# Patient Record
Sex: MALE | Race: ASIAN | NOT HISPANIC OR LATINO | URBAN - METROPOLITAN AREA
[De-identification: names, ages, dates, MRNs, and addresses within clinical notes are randomized per-mention and may not be internally consistent; named-entity substitution may affect disease eponyms.]

---

## 2022-08-15 ENCOUNTER — EMERGENCY (EMERGENCY)
Facility: HOSPITAL | Age: 62
LOS: 1 days | Discharge: HOME | End: 2022-08-15
Attending: EMERGENCY MEDICINE | Admitting: EMERGENCY MEDICINE
Payer: MEDICAID

## 2022-08-15 VITALS
OXYGEN SATURATION: 98 % | WEIGHT: 175.05 LBS | RESPIRATION RATE: 16 BRPM | DIASTOLIC BLOOD PRESSURE: 84 MMHG | HEART RATE: 88 BPM | SYSTOLIC BLOOD PRESSURE: 139 MMHG | TEMPERATURE: 97 F

## 2022-08-15 VITALS
DIASTOLIC BLOOD PRESSURE: 80 MMHG | RESPIRATION RATE: 18 BRPM | SYSTOLIC BLOOD PRESSURE: 130 MMHG | TEMPERATURE: 98 F | HEART RATE: 80 BPM | OXYGEN SATURATION: 98 %

## 2022-08-15 PROCEDURE — 99284 EMERGENCY DEPT VISIT MOD MDM: CPT | Mod: 25

## 2022-08-15 PROCEDURE — 70490 CT SOFT TISSUE NECK W/O DYE: CPT | Mod: MA

## 2022-08-15 PROCEDURE — 99284 EMERGENCY DEPT VISIT MOD MDM: CPT

## 2022-08-15 PROCEDURE — 70490 CT SOFT TISSUE NECK W/O DYE: CPT | Mod: 26,MA

## 2022-08-15 NOTE — CONSULT NOTE ADULT - ASSESSMENT
dysphagia  forgein body sensation    no signs of complete obstruction or perforation  check CT neck r/o foreign body  if negative he can be discharged with outpatient follow up with ENT as complaints are mostly in his neck

## 2022-08-15 NOTE — ED PROVIDER NOTE - PATIENT PORTAL LINK FT
You can access the FollowMyHealth Patient Portal offered by [facility] by registering at the following website: [email or web address]. By joining [employer]’s FollowMyHealth portal, you will also be able to view your health information using other applications (apps) compatible with our system.

## 2022-08-15 NOTE — ED PROVIDER NOTE - CLINICAL SUMMARY MEDICAL DECISION MAKING FREE TEXT BOX
Montemayor: 60 yo M PMHx HTN, DM, HLD presents to ED c/o intermittent FB sensation in throat x 2 weeks. Pt reports choking episodes with water off and on over the last 2 weeks. Saw his doctor after first episode, was prescribed Ibuprofen and Maalox. Pt reports no improvement. Denies any dysphagia with solid foods. Pt denies chest pain, SOB, fever/chills, abd pain, N/V.

## 2022-08-15 NOTE — CONSULT NOTE ADULT - SUBJECTIVE AND OBJECTIVE BOX
[facility]  Marty Cedeno PA-C  [address] [place] [id number] [phone]      Chief Complaint:  Patient is a 61y old  Male who presents with a chief complaint of     HPI:62 yo M PMHx HTN, DM, HLD presents to ED c/o intermittent FB sensation in throat x 2 weeks. Pt reports choking episodes with water off and on over the last 2 weeks. Saw his doctor after first episode, was prescribed Ibuprofen and Maalox. Pt reports no improvement. Denies any dysphagia with solid foods. Pt denies chest pain, SOB, fever/chills, abd pain, N/V.    Allergies:  No Known Allergies      Medications:      PMHX/PSHX:  Diabetes    Hypercholesteremia    Hypertension    No significant past surgical history        Family history:      Social History:     ROS:     General:  No wt loss, fevers, chills, night sweats, fatigue,   Eyes:  Good vision, no reported pain  ENT:  No sore throat, pain, runny nose, dysphagia  CV:  No pain, palpitations, hypo/hypertension  Resp:  No dyspnea, cough, tachypnea, wheezing  GI:  No pain, No nausea, No vomiting, No diarrhea, No constipation, No weight loss, No fever, No pruritis, No rectal bleeding, No tarry stools, No dysphagia,  :  No pain, bleeding, incontinence, nocturia  Muscle:  No pain, weakness  Neuro:  No weakness, tingling, memory problems  Psych:  No fatigue, insomnia, mood problems, depression  Endocrine:  No polyuria, polydipsia, cold/heat intolerance  Heme:  No petechiae, ecchymosis, easy bruisability  Skin:  No rash, tattoos, scars, edema      PHYSICAL EXAM:   Vital Signs:  Vital Signs Last 24 Hrs  T(C): 36.2 (15 Aug 2022 10:37), Max: 36.2 (15 Aug 2022 10:37)  T(F): 97.2 (15 Aug 2022 10:37), Max: 97.2 (15 Aug 2022 10:37)  HR: 88 (15 Aug 2022 10:37) (88 - 88)  BP: 139/84 (15 Aug 2022 10:37) (139/84 - 139/84)  BP(mean): --  RR: 16 (15 Aug 2022 10:37) (16 - 16)  SpO2: 98% (15 Aug 2022 10:37) (98% - 98%)    Parameters below as of 15 Aug 2022 10:37  Patient On (Oxygen Delivery Method): room air      Daily     Daily     GENERAL:  Appears stated age,   HEENT:  NC/AT,    CHEST:  Full & symmetric excursion,   HEART:  Regular rhythm  ABDOMEN:  Soft, non-tender, non-distended,   EXTEREMITIES:  no cyanosis,clubbing or edema  SKIN:  No rash  NEURO:  Alert,    LABS:                    Imaging:

## 2022-08-15 NOTE — ED PROVIDER NOTE - NSFOLLOWUPINSTRUCTIONS_ED_ALL_ED_FT
1) Follow up with ENT within 1 week  2) Return to the ED immediately for new or worsening symptoms as we discussed.    Dysphagia       Dysphagia is trouble swallowing. This condition occurs when solids and liquids stick in a person's throat on the way down to the stomach, or when food takes longer to get to the stomach than usual.    You may have problems swallowing food, liquids, or both. You may also have pain while trying to swallow. It may take you more time and effort to swallow something.      What are the causes?    This condition may be caused by:  •Muscle problems. These may make it difficult for you to move food and liquids through the esophagus, which is the tube that connects your mouth to your stomach.    •Blockages. You may have ulcers, scar tissue, or inflammation that blocks the normal passage of food and liquids. Causes of these problems include:  •Acid reflux from your stomach into your esophagus (gastroesophageal reflux).      •Infections.      •Radiation treatment for cancer.      •Medicines taken without enough fluids to wash them down into your stomach.        •Stroke. This can affect the nerves and make it difficult to swallow.      •Nerve problems. These prevent signals from being sent to the muscles of your esophagus to squeeze (contract) and move what you swallow down to your stomach.      •Globus pharyngeus. This is a common problem that involves a feeling like something is stuck in your throat or a sense of trouble with swallowing, even though nothing is wrong with the swallowing passages.      •Certain conditions, such as cerebral palsy or Parkinson's disease.        What are the signs or symptoms?    Common symptoms of this condition include:  •A feeling that solids or liquids are stuck in your throat on the way down to the stomach.      •Pain while swallowing.      •Coughing or gagging while trying to swallow.      Other symptoms include:  •Food moving back from your stomach to your mouth (regurgitation).      •Noises coming from your throat.      •Chest discomfort when swallowing.      •A feeling of fullness when swallowing.      •Drooling, especially when the throat is blocked.      •Heartburn.        How is this diagnosed?    This condition may be diagnosed by:  •Barium swallow X-ray. In this test, you will swallow a white liquid that sticks to the inside of your esophagus. X-ray images are then taken.      •Endoscopy. In this test, a flexible telescope is inserted down your throat to look at your esophagus and your stomach.      •CT scans or an MRI.        How is this treated?    Treatment for dysphagia depends on the cause of this condition:  •If the dysphagia is caused by acid reflux or infection, medicines may be used. These may include antibiotics or heartburn medicines.      •If the dysphagia is caused by problems with the muscles, swallowing therapy may be used to help you strengthen your swallowing muscles. You may have to do specific exercises to strengthen the muscles or stretch them.      •If the dysphagia is caused by a blockage or mass, procedures to remove the blockage may be done. You may need surgery and a feeding tube.      You may need to make diet changes. Ask your health care provider for specific instructions.      Follow these instructions at home:    Medicines     •Take over-the-counter and prescription medicines only as told by your health care provider.      •If you were prescribed an antibiotic medicine, take it as told by your health care provider. Do not stop taking the antibiotic even if you start to feel better.        Eating and drinking      •Make any diet changes as told by your health care provider.      •Work with a diet and nutrition specialist (dietitian) to create an eating plan that will help you get the nutrients you need in order to stay healthy.      •Eat soft foods that are easier to swallow.      •Cut your food into small pieces and eat slowly. Take small bites.      •Eat and drink only when you are sitting upright.      • Do not drink alcohol or caffeine. If you need help quitting, ask your health care provider.      General instructions     •Check your weight every day to make sure you are not losing weight.      • Do not use any products that contain nicotine or tobacco. These products include cigarettes, chewing tobacco, and vaping devices, such as e-cigarettes. If you need help quitting, ask your health care provider.      •Keep all follow-up visits. This is important.        Contact a health care provider if:    •You lose weight because you cannot swallow.      •You cough when you drink liquids.      •You cough up partially digested food.        Get help right away if:    •You cannot swallow your saliva.      •You have shortness of breath, a fever, or both.      •Your voice is hoarse and you have trouble swallowing.      These symptoms may represent a serious problem that is an emergency. Do not wait to see if the symptoms will go away. Get medical help right away. Call your local emergency services (911 in the [place] Do not drive yourself to the hospital.       Summary    •Dysphagia is trouble swallowing. This condition occurs when solids and liquids stick in a person's throat on the way down to the stomach. You may cough or gag while trying to swallow.      •Dysphagia has many possible causes.      •Treatment for dysphagia depends on the cause of the condition.      •Keep all follow-up visits. This is important.      This information is not intended to replace advice given to you by your health care provider. Make sure you discuss any questions you have with your health care provider.      Document Revised: 08/07/2021 Document Reviewed: 08/07/2021    Elsevier Patient Education © 2022 [employer]

## 2022-08-15 NOTE — ED ADULT NURSE NOTE - OBJECTIVE STATEMENT
Pt states that about 9 days he felt like he was choking after drinking water. Pt states he feels like he has something in his throat. Pt states he coughed today after drinking  fluids. Pt denies nausea and vomiting. Pt in no acute respiratory distress. Pt updated on plan of care.

## 2022-08-15 NOTE — ED PROVIDER NOTE - OBJECTIVE STATEMENT
62 yo M PMHx HTN, DM, HLD presents to ED c/o intermittent FB sensation in throat x 2 weeks. Pt reports choking episodes with water off and on over the last 2 weeks. Saw his doctor after first episode, was prescribed Ibuprofen and Maalox. Pt reports no improvement. Denies any dysphagia with solid foods. Pt denies chest pain, SOB, fever/chills, abd pain, N/V.

## 2022-08-15 NOTE — ED PROVIDER NOTE - CARE PROVIDER_API CALL
Monty Huitron)  Otolaryngology  [address], [address]  [place] [address]  Phone: [phone]  Fax: [phone]  Follow Up Time:

## 2022-08-15 NOTE — ED PROVIDER NOTE - PROGRESS NOTE DETAILS
pt tolerating po. GI recs ENT follow up. Discussed the results of all diagnostic testing in ED and copies of all reports given.   Pt was given an opportunity to have all questions answered to satisfaction.  Discussed the importance of prompt, close medical follow-up. ED return precautions discussed at length.  Pt verbalizes agreement and understanding of plan and ED return precautions. Pt well appearing, stable for DC home. No emergent concerns at this time.

## 2022-08-26 ENCOUNTER — EMERGENCY (EMERGENCY)
Facility: HOSPITAL | Age: 62
LOS: 1 days | Discharge: HOME | End: 2022-08-26
Attending: EMERGENCY MEDICINE | Admitting: EMERGENCY MEDICINE
Payer: MEDICAID

## 2022-08-26 VITALS
SYSTOLIC BLOOD PRESSURE: 150 MMHG | DIASTOLIC BLOOD PRESSURE: 80 MMHG | RESPIRATION RATE: 16 BRPM | OXYGEN SATURATION: 98 % | HEART RATE: 80 BPM | TEMPERATURE: 98 F

## 2022-08-26 VITALS
DIASTOLIC BLOOD PRESSURE: 81 MMHG | HEART RATE: 88 BPM | SYSTOLIC BLOOD PRESSURE: 167 MMHG | OXYGEN SATURATION: 98 % | WEIGHT: 167.99 LBS | TEMPERATURE: 98 F | RESPIRATION RATE: 16 BRPM

## 2022-08-26 LAB
ALBUMIN SERPL ELPH-MCNC: 4.1 G/DL — SIGNIFICANT CHANGE UP (ref 3.3–5)
ALP SERPL-CCNC: 104 U/L — SIGNIFICANT CHANGE UP (ref 40–120)
ALT FLD-CCNC: 23 U/L — SIGNIFICANT CHANGE UP (ref 12–78)
ANION GAP SERPL CALC-SCNC: 7 MMOL/L — SIGNIFICANT CHANGE UP (ref 5–17)
AST SERPL-CCNC: 14 U/L — LOW (ref 15–37)
BASOPHILS # BLD AUTO: 0.03 K/UL — SIGNIFICANT CHANGE UP (ref 0–0.2)
BASOPHILS NFR BLD AUTO: 0.6 % — SIGNIFICANT CHANGE UP (ref 0–2)
BILIRUB SERPL-MCNC: 0.8 MG/DL — SIGNIFICANT CHANGE UP (ref 0.2–1.2)
BUN SERPL-MCNC: 14 MG/DL — SIGNIFICANT CHANGE UP (ref 7–23)
CALCIUM SERPL-MCNC: 9 MG/DL — SIGNIFICANT CHANGE UP (ref 8.5–10.1)
CHLORIDE SERPL-SCNC: 107 MMOL/L — SIGNIFICANT CHANGE UP (ref 96–108)
CO2 SERPL-SCNC: 29 MMOL/L — SIGNIFICANT CHANGE UP (ref 22–31)
CREAT SERPL-MCNC: 1.1 MG/DL — SIGNIFICANT CHANGE UP (ref 0.5–1.3)
EGFR: 77 ML/MIN/1.73M2 — SIGNIFICANT CHANGE UP
EOSINOPHIL # BLD AUTO: 0.12 K/UL — SIGNIFICANT CHANGE UP (ref 0–0.5)
EOSINOPHIL NFR BLD AUTO: 2.4 % — SIGNIFICANT CHANGE UP (ref 0–6)
GLUCOSE SERPL-MCNC: 158 MG/DL — HIGH (ref 70–99)
HCT VFR BLD CALC: 45.3 % — SIGNIFICANT CHANGE UP (ref 39–50)
HGB BLD-MCNC: 15 G/DL — SIGNIFICANT CHANGE UP (ref 13–17)
IMM GRANULOCYTES NFR BLD AUTO: 0.2 % — SIGNIFICANT CHANGE UP (ref 0–1.5)
LYMPHOCYTES # BLD AUTO: 2.06 K/UL — SIGNIFICANT CHANGE UP (ref 1–3.3)
LYMPHOCYTES # BLD AUTO: 40.6 % — SIGNIFICANT CHANGE UP (ref 13–44)
MCHC RBC-ENTMCNC: 28 PG — SIGNIFICANT CHANGE UP (ref 27–34)
MCHC RBC-ENTMCNC: 33.1 GM/DL — SIGNIFICANT CHANGE UP (ref 32–36)
MCV RBC AUTO: 84.5 FL — SIGNIFICANT CHANGE UP (ref 80–100)
MONOCYTES # BLD AUTO: 0.54 K/UL — SIGNIFICANT CHANGE UP (ref 0–0.9)
MONOCYTES NFR BLD AUTO: 10.7 % — SIGNIFICANT CHANGE UP (ref 2–14)
NEUTROPHILS # BLD AUTO: 2.31 K/UL — SIGNIFICANT CHANGE UP (ref 1.8–7.4)
NEUTROPHILS NFR BLD AUTO: 45.5 % — SIGNIFICANT CHANGE UP (ref 43–77)
NRBC # BLD: 0 /100 WBCS — SIGNIFICANT CHANGE UP (ref 0–0)
PLATELET # BLD AUTO: 202 K/UL — SIGNIFICANT CHANGE UP (ref 150–400)
POTASSIUM SERPL-MCNC: 4.3 MMOL/L — SIGNIFICANT CHANGE UP (ref 3.5–5.3)
POTASSIUM SERPL-SCNC: 4.3 MMOL/L — SIGNIFICANT CHANGE UP (ref 3.5–5.3)
PROT SERPL-MCNC: 7.5 G/DL — SIGNIFICANT CHANGE UP (ref 6–8.3)
RBC # BLD: 5.36 M/UL — SIGNIFICANT CHANGE UP (ref 4.2–5.8)
RBC # FLD: 13.4 % — SIGNIFICANT CHANGE UP (ref 10.3–14.5)
SODIUM SERPL-SCNC: 143 MMOL/L — SIGNIFICANT CHANGE UP (ref 135–145)
WBC # BLD: 5.07 K/UL — SIGNIFICANT CHANGE UP (ref 3.8–10.5)
WBC # FLD AUTO: 5.07 K/UL — SIGNIFICANT CHANGE UP (ref 3.8–10.5)

## 2022-08-26 PROCEDURE — 85025 COMPLETE CBC W/AUTO DIFF WBC: CPT

## 2022-08-26 PROCEDURE — 80053 COMPREHEN METABOLIC PANEL: CPT

## 2022-08-26 PROCEDURE — 99284 EMERGENCY DEPT VISIT MOD MDM: CPT

## 2022-08-26 PROCEDURE — 96374 THER/PROPH/DIAG INJ IV PUSH: CPT

## 2022-08-26 PROCEDURE — 36415 COLL VENOUS BLD VENIPUNCTURE: CPT

## 2022-08-26 PROCEDURE — 99284 EMERGENCY DEPT VISIT MOD MDM: CPT | Mod: 25

## 2022-08-26 RX ORDER — LIDOCAINE 4 G/100G
10 CREAM TOPICAL ONCE
Refills: 0 | Status: COMPLETED | OUTPATIENT
Start: 2022-08-26 | End: 2022-08-26

## 2022-08-26 RX ORDER — SODIUM CHLORIDE 9 MG/ML
1000 INJECTION INTRAMUSCULAR; INTRAVENOUS; SUBCUTANEOUS ONCE
Refills: 0 | Status: COMPLETED | OUTPATIENT
Start: 2022-08-26 | End: 2022-08-26

## 2022-08-26 RX ORDER — FAMOTIDINE 10 MG/ML
20 INJECTION INTRAVENOUS ONCE
Refills: 0 | Status: COMPLETED | OUTPATIENT
Start: 2022-08-26 | End: 2022-08-26

## 2022-08-26 RX ORDER — SUCRALFATE 1 G
10 TABLET ORAL
Qty: 280 | Refills: 0
Start: 2022-08-26 | End: 2022-09-01

## 2022-08-26 RX ORDER — SUCRALFATE 1 G
1 TABLET ORAL ONCE
Refills: 0 | Status: DISCONTINUED | OUTPATIENT
Start: 2022-08-26 | End: 2022-08-30

## 2022-08-26 RX ADMIN — Medication 30 MILLILITER(S): at 18:36

## 2022-08-26 RX ADMIN — SODIUM CHLORIDE 1000 MILLILITER(S): 9 INJECTION INTRAMUSCULAR; INTRAVENOUS; SUBCUTANEOUS at 18:17

## 2022-08-26 RX ADMIN — FAMOTIDINE 20 MILLIGRAM(S): 10 INJECTION INTRAVENOUS at 18:36

## 2022-08-26 RX ADMIN — LIDOCAINE 10 MILLILITER(S): 4 CREAM TOPICAL at 18:37

## 2022-08-26 NOTE — ED PROVIDER NOTE - TEMPLATE, MLM
----- Message from Colin Kennedy sent at 8/25/2022  9:22 AM EDT -----  Subject: Refill Request    QUESTIONS  Name of Medication? oxyCODONE-acetaminophen (PERCOCET) 5-325 mg per tablet  Patient-reported dosage and instructions? 5-325 mg  How many days do you have left? 4  Preferred Pharmacy? [id number]  Pharmacy phone number (if available)? [phone]  ---------------------------------------------------------------------------  --------------,  Name of Medication? trimethoprim-sulfamethoxazole (BACTRIM DS, SEPTRA DS)   160-800 mg per tablet  Patient-reported dosage and instructions? 160-800 mg  How many days do you have left? 0  Preferred Pharmacy? [facility][id number]  Pharmacy phone number (if available)? [phone]  ---------------------------------------------------------------------------  --------------  CALL BACK INFO  What is the best way for the office to contact you? OK to leave message on   voicemail  Preferred Call Back Phone Number? [phone]  ---------------------------------------------------------------------------  --------------  SCRIPT ANSWERS  Relationship to Patient?  Self
Duplicate requests. Both bactrim and oxycodone sent today, 8/25/22 by MD Monroy. Thanks, Jaden Wang Dr in place:   Recommendation Provided To:    Intervention Detail: Discontinued Rx: 2, reason: Duplicate Therapy  Gap Closed?:   Intervention Accepted By:   Time Spent (min): 5
General

## 2022-08-26 NOTE — ED PROVIDER NOTE - OBJECTIVE STATEMENT
61 M c/o foreign body sensation, choking when eating/drinking x 2 weeks. Has upcoming endoscopy Tuesday but states symptoms worsening, can not wait. Was seen in this ED when symptoms began, had CT, GI eval, was recommended to f/u with ENT. Saw ENT Dr. Bill Cervantes and was sent for swallow study, normal per pt. ENT also prescribed augmentin. Had also been given ibuprofen and maalox by PMD.     PAT Richey  pmd torrie tovar

## 2022-08-26 NOTE — ED PROVIDER NOTE - PATIENT PORTAL LINK FT
You can access the FollowMyHealth Patient Portal offered by [facility] by registering at the following website: [email or web address]. By joining [employer]’s FollowMyHealth portal, you will also be able to view your health information using other applications (apps) compatible with our system.

## 2022-08-26 NOTE — ED PROVIDER NOTE - CLINICAL SUMMARY MEDICAL DECISION MAKING FREE TEXT BOX
DT: I have personally performed a face to face diagnostic evaluation on this patient.  I have reviewed the PA's note and agree with the history, exam, and plan of care, except as noted.  History and Exam by me shows 61 M c/o foreign body sensation, choking when eating/drinking x 2 weeks. Has upcoming endoscopy Tuesday but states symptoms worsening, can not wait. Was seen in this ED when symptoms began, had CT, GI eval, was recommended to f/u with ENT. Saw ENT Dr. Bill Cervantes and was sent for swallow study, normal per pt. ENT also prescribed augmentin. Had also been given ibuprofen and maalox by PMD.  .  Patient is NAD.  A n O x 3. Head NC/AT. Lungs cta bl. Heart s1,s2, rrr, no murmurs. Abd-soft, nt, no guarding, no rebound, no distension, no cva tenderness. Ext- FROM actively,  ambulating s any difficulty.  Labs were unremarkable.

## 2022-08-26 NOTE — ED ADULT NURSE NOTE - OBJECTIVE STATEMENT
the patient presents to the er with continued persistent complaints of choking for the past 2-2.5 weeks.  he states he has been able to eat / swallow secretions / drink but started having difficulty today.  alert / oriented x4.

## 2022-08-26 NOTE — ED PROVIDER NOTE - NSFOLLOWUPINSTRUCTIONS_ED_ALL_ED_FT
Dysphagia is trouble swallowing. You may have trouble moving food or liquid from your mouth to your esophagus or down to your stomach. You may have the problem when you eat, drink, or any time you try to swallow. Dysphagia can last a short time, or it can be a permanent problem.  Abdominal Organs         DISCHARGE INSTRUCTIONS:    Call your local emergency number (911 in the [place]) if:   •You have chest pain.      •You have shortness of breath.      Return to the emergency department if:   •You choke on your saliva.      •You cannot eat or drink liquids at all.      Call your doctor or therapist if:   •You lose weight without trying.      •Your signs and symptoms get worse, or you have new signs or symptoms.      •You have signs or symptoms of dehydration, such as increased thirst, dark yellow urine, or little or no urine.      •You get colds often.      •You have questions or concerns about your condition or care.      Nutrition: You may need to change the texture of the foods you eat to help reduce choking problems. Your healthcare provider may show you how to thicken liquids or soften foods to make them easier to swallow.    A therapist can teach you different ways of swallowing by changing your head and body positions. You may be taught exercises to strengthen the muscles that help you swallow.    Follow up with your doctor or therapist as directed: Write down your questions so you remember to ask them during your visits. Liquid diet as discussed.  Carafate four times a day.  Follow up for endoscopy as planned.  Return to the Emergency Department for worsening or concerning symptoms.      Dysphagia is trouble swallowing. You may have trouble moving food or liquid from your mouth to your esophagus or down to your stomach. You may have the problem when you eat, drink, or any time you try to swallow. Dysphagia can last a short time, or it can be a permanent problem.  Abdominal Organs         DISCHARGE INSTRUCTIONS:    Call your local emergency number (911 in the [place]) if:   •You have chest pain.      •You have shortness of breath.      Return to the emergency department if:   •You choke on your saliva.      •You cannot eat or drink liquids at all.      Call your doctor or therapist if:   •You lose weight without trying.      •Your signs and symptoms get worse, or you have new signs or symptoms.      •You have signs or symptoms of dehydration, such as increased thirst, dark yellow urine, or little or no urine.      •You get colds often.      •You have questions or concerns about your condition or care.      Nutrition: You may need to change the texture of the foods you eat to help reduce choking problems. Your healthcare provider may show you how to thicken liquids or soften foods to make them easier to swallow.    A therapist can teach you different ways of swallowing by changing your head and body positions. You may be taught exercises to strengthen the muscles that help you swallow.    Follow up with your doctor or therapist as directed: Write down your questions so you remember to ask them during your visits.

## 2022-08-26 NOTE — ED PROVIDER NOTE - PROGRESS NOTE DETAILS
tolerating po, discussed with dr elsi dow who recommends liquid diet, carafate suspension qid    Reevaluated patient at bedside.  Patient feeling well. Discussed the results of all diagnostic testing in ED and copies of all available reports given.   An opportunity to ask questions was provided.  Discussed the importance of prompt, close medical follow-up.  Patient will return with any changes, concerns or persistent/worsening symptoms.  Understanding of all instructions verbalized.

## 2022-08-26 NOTE — ED PROVIDER NOTE - ENMT, MLM
Airway patent, Nasal mucosa clear. Mouth with normal mucosa. Throat has no vesicles, no oropharyngeal exudates and uvula is midline. No drooling. No stridor.

## 2022-08-26 NOTE — ED ADULT TRIAGE NOTE - CHIEF COMPLAINT QUOTE
C/O Feeling chocking for 2 weeks. Patient was here for the same. Patient got discharged and followed up with ENT. Patient still feels chocking

## 2024-05-16 ENCOUNTER — APPOINTMENT (OUTPATIENT)
Dept: OPHTHALMOLOGY | Facility: CLINIC | Age: 64
End: 2024-05-16

## 2024-06-05 ENCOUNTER — APPOINTMENT (OUTPATIENT)
Dept: OPHTHALMOLOGY | Facility: CLINIC | Age: 64
End: 2024-06-05